# Patient Record
Sex: FEMALE | Race: BLACK OR AFRICAN AMERICAN | NOT HISPANIC OR LATINO | URBAN - METROPOLITAN AREA
[De-identification: names, ages, dates, MRNs, and addresses within clinical notes are randomized per-mention and may not be internally consistent; named-entity substitution may affect disease eponyms.]

---

## 2022-05-02 PROBLEM — Z00.00 ENCOUNTER FOR PREVENTIVE HEALTH EXAMINATION: Status: ACTIVE | Noted: 2022-05-02

## 2022-05-11 ENCOUNTER — EMERGENCY (EMERGENCY)
Facility: HOSPITAL | Age: 42
LOS: 0 days | Discharge: HOME | End: 2022-05-11
Payer: COMMERCIAL

## 2022-05-11 VITALS
SYSTOLIC BLOOD PRESSURE: 114 MMHG | OXYGEN SATURATION: 98 % | HEART RATE: 87 BPM | DIASTOLIC BLOOD PRESSURE: 68 MMHG | TEMPERATURE: 99 F | RESPIRATION RATE: 18 BRPM

## 2022-05-11 VITALS
OXYGEN SATURATION: 98 % | HEIGHT: 69 IN | RESPIRATION RATE: 19 BRPM | DIASTOLIC BLOOD PRESSURE: 77 MMHG | WEIGHT: 179.9 LBS | TEMPERATURE: 98 F | SYSTOLIC BLOOD PRESSURE: 128 MMHG | HEART RATE: 108 BPM

## 2022-05-11 DIAGNOSIS — M79.651 PAIN IN RIGHT THIGH: ICD-10-CM

## 2022-05-11 DIAGNOSIS — Z98.890 OTHER SPECIFIED POSTPROCEDURAL STATES: Chronic | ICD-10-CM

## 2022-05-11 DIAGNOSIS — Z98.890 OTHER SPECIFIED POSTPROCEDURAL STATES: ICD-10-CM

## 2022-05-11 PROCEDURE — 99284 EMERGENCY DEPT VISIT MOD MDM: CPT

## 2022-05-11 PROCEDURE — 93971 EXTREMITY STUDY: CPT | Mod: 26,RT

## 2022-05-11 RX ORDER — CYCLOBENZAPRINE HYDROCHLORIDE 10 MG/1
10 TABLET, FILM COATED ORAL ONCE
Refills: 0 | Status: COMPLETED | OUTPATIENT
Start: 2022-05-11 | End: 2022-05-11

## 2022-05-11 RX ORDER — KETOROLAC TROMETHAMINE 30 MG/ML
15 SYRINGE (ML) INJECTION ONCE
Refills: 0 | Status: DISCONTINUED | OUTPATIENT
Start: 2022-05-11 | End: 2022-05-11

## 2022-05-11 RX ADMIN — CYCLOBENZAPRINE HYDROCHLORIDE 10 MILLIGRAM(S): 10 TABLET, FILM COATED ORAL at 16:54

## 2022-05-11 RX ADMIN — Medication 15 MILLIGRAM(S): at 19:05

## 2022-05-11 NOTE — ED ADULT NURSE NOTE - NSIMPLEMENTINTERV_GEN_ALL_ED
Implemented All Fall Risk Interventions:  [place] to call system. Call bell, personal items and telephone within reach. Instruct patient to call for assistance. Room bathroom lighting operational. Non-slip footwear when patient is off stretcher. Physically safe environment: no spills, clutter or unnecessary equipment. Stretcher in lowest position, wheels locked, appropriate side rails in place. Provide visual cue, wrist band, yellow gown, etc. Monitor gait and stability. Monitor for mental status changes and reorient to person, place, and time. Review medications for side effects contributing to fall risk. Reinforce activity limits and safety measures with patient and family.

## 2022-05-11 NOTE — ED ADULT NURSE NOTE - OBJECTIVE STATEMENT
pt is 40 y/o female c/c of bilateral breast reduction and Pinealectomy yesterday. pt c/o of R thigh pain, here to r/o DVT.  Sent by Dr. López Cordova. LMP 4/20/22. pt is 40 y/o female c/c of bilateral breast reduction and panniculectomy yesterday. pt c/o of R thigh pain, here to r/o DVT.  Sent by Dr. López Cordova. LMP 4/20/22.

## 2022-05-11 NOTE — ED PROVIDER NOTE - NSFOLLOWUPINSTRUCTIONS_ED_ALL_ED_FT
Leg Cramps      Leg cramps occur when one or more muscles tighten and a person has no control over it (involuntary muscle contraction). Muscle cramps are most common in the calf muscles of the leg. They can occur during exercise or at rest. Leg cramps are painful, and they may last for a few seconds to a few minutes. Cramps may return several times before they finally stop.    Usually, leg cramps are not caused by a serious medical problem. In many cases, the cause is not known. Some common causes include:  •Excessive physical effort (overexertion), such as during intense exercise.      •Doing the same motion over and over.      •Staying in a certain position for a long period of time.      •Improper preparation, form, or technique while doing a sport or an activity.      •Dehydration.      •Injury.      •Side effects of certain medicines.    •Abnormally low levels of minerals in your blood (electrolytes), especially potassium and calcium. This could result from:  •Pregnancy.      •Taking diuretic medicines.          Follow these instructions at home:    Eating and drinking     •Drink enough fluid to keep your urine pale yellow. Staying hydrated may help prevent cramps.      •Eat a healthy diet that includes plenty of nutrients to help your muscles function. A healthy diet includes fruits and vegetables, lean protein, whole grains, and low-fat or nonfat dairy products.        Managing pain, stiffness, and swelling                   •Try massaging, stretching, and relaxing the affected muscle. Do this for several minutes at a time.    •If directed, put ice on areas that are sore or painful after a cramp. To do this:  •Put ice in a plastic bag.      •Place a towel between your skin and the bag.      •Leave the ice on for 20 minutes, 2–3 times a day.      •Remove the ice if your skin turns bright red. This is very important. If you cannot feel pain, heat, or cold, you have a greater risk of damage to the area.      •If directed, apply heat to muscles that are tense or tight. Do this before you exercise, or as often as told by your health care provider. Use the heat source that your health care provider recommends, such as a moist heat pack or a heating pad. To do this:  •Place a towel between your skin and the heat source.      •Leave the heat on for 20–30 minutes.       •Remove the heat if your skin turns bright red. This is especially important if you are unable to feel pain, heat, or cold. You may have a greater risk of getting burned.        •Try taking hot showers or baths to help relax tight muscles.      General instructions     •If you are having frequent leg cramps, avoid intense exercise for several days.      •Take over-the-counter and prescription medicines only as told by your health care provider.      •Keep all follow-up visits. This is important.        Contact a health care provider if:    •Your leg cramps get more severe or more frequent, or they do not improve over time.      •Your foot becomes cold, numb, or blue.        Summary    •Muscle cramps can develop in any muscle, but the most common place is in the calf muscles of the leg.      •Leg cramps are painful, and they may last for a few seconds to a few minutes.      •Usually, leg cramps are not caused by a serious medical problem. Often, the cause is not known.      •Stay hydrated, and take over-the-counter and prescription medicines only as told by your health care provider.      This information is not intended to replace advice given to you by your health care provider. Make sure you discuss any questions you have with your health care provider.

## 2022-05-11 NOTE — ED PROVIDER NOTE - OBJECTIVE STATEMENT
42 yo F, denies pmhx, s/p tammi breast reduction and panniculectomy yesterday [5/11] by Dr. López Cordova, presenting to the ER w/ pain and stiffness of the lateral mid thigh muscles x 1 day. Pt went to her post-op appointment today and was told to come to the ER to r/o clot. She reports no chest pain, SOB, nausea, vomiting, diarrhea, back pain, headaches, or dizziness.  Pt states she was told her wounds are healing well.

## 2022-05-11 NOTE — ED ADULT TRIAGE NOTE - CHIEF COMPLAINT QUOTE
S/p bilat breast and Pinealectomy yesterday -c/o Right leg numb and hard  Sent by Dr. López Cordova  Last Tylenol 1 hr ago, Percocet 7am

## 2022-05-11 NOTE — ED PROVIDER NOTE - PATIENT PORTAL LINK FT
You can access the FollowMyHealth Patient Portal offered by [facility] by registering at the following website: [email or web address]. By joining [email or web address]’s FollowMyHealth portal, you will also be able to view your health information using other applications (apps) compatible with our system.

## 2022-05-11 NOTE — ED PROVIDER NOTE - MUSCULOSKELETAL, MLM
+ttp along the R lateral thigh w/ taut muscles in area, no medial ttp of thigh, no calf ttp; no skin changes

## 2022-05-11 NOTE — ED PROVIDER NOTE - CLINICAL SUMMARY MEDICAL DECISION MAKING FREE TEXT BOX
42 yo F, denies pmhx, s/p tammi breast reduction and panniculectomy yesterday [5/11] by Dr. López Cordova, presenting to the ER w/ pain and stiffness of the lateral mid thigh muscles x 1 day. Patient found to have thigh pain and taut-ness along the lateral side. Ddx includes muscle spasm vs blood clot vs muscle strain. Will obtain duplex for RLE and given flexeril for symptoms relief. Will reassess.

## 2022-05-25 ENCOUNTER — APPOINTMENT (OUTPATIENT)
Dept: HEMATOLOGY ONCOLOGY | Facility: CLINIC | Age: 42
End: 2022-05-25

## 2022-07-21 ENCOUNTER — RESULT REVIEW (OUTPATIENT)
Age: 42
End: 2022-07-21